# Patient Record
Sex: FEMALE | Race: WHITE | NOT HISPANIC OR LATINO
[De-identification: names, ages, dates, MRNs, and addresses within clinical notes are randomized per-mention and may not be internally consistent; named-entity substitution may affect disease eponyms.]

---

## 2024-01-16 ENCOUNTER — DASHBOARD ENCOUNTERS (OUTPATIENT)
Age: 44
End: 2024-01-16

## 2024-01-16 ENCOUNTER — OFFICE VISIT (OUTPATIENT)
Dept: URBAN - METROPOLITAN AREA CLINIC 72 | Facility: CLINIC | Age: 44
End: 2024-01-16
Payer: MEDICARE

## 2024-01-16 ENCOUNTER — LAB OUTSIDE AN ENCOUNTER (OUTPATIENT)
Dept: URBAN - METROPOLITAN AREA CLINIC 72 | Facility: CLINIC | Age: 44
End: 2024-01-16

## 2024-01-16 VITALS
WEIGHT: 164 LBS | BODY MASS INDEX: 24.29 KG/M2 | DIASTOLIC BLOOD PRESSURE: 92 MMHG | HEART RATE: 104 BPM | HEIGHT: 69 IN | TEMPERATURE: 96.9 F | SYSTOLIC BLOOD PRESSURE: 133 MMHG

## 2024-01-16 DIAGNOSIS — K50.80 CROHN'S DISEASE OF BOTH SMALL AND LARGE INTESTINE WITHOUT COMPLICATION: ICD-10-CM

## 2024-01-16 DIAGNOSIS — Z90.49 HISTORY OF BOWEL RESECTION: ICD-10-CM

## 2024-01-16 PROBLEM — 71833008: Status: ACTIVE | Noted: 2024-01-16

## 2024-01-16 PROBLEM — 442461003: Status: ACTIVE | Noted: 2024-01-16

## 2024-01-16 PROCEDURE — 99204 OFFICE O/P NEW MOD 45 MIN: CPT | Performed by: NURSE PRACTITIONER

## 2024-01-16 RX ORDER — ETANERCEPT 50 MG/ML
SOLUTION SUBCUTANEOUS
Qty: 8 | Status: ACTIVE | COMMUNITY

## 2024-01-16 RX ORDER — TRAZODONE HYDROCHLORIDE 50 MG/1
TABLET ORAL
Qty: 90 TABLET | Status: ACTIVE | COMMUNITY

## 2024-01-16 RX ORDER — BUPROPION HYDROCHLORIDE 150 MG/1
TABLET, FILM COATED, EXTENDED RELEASE ORAL
Qty: 90 TABLET | Status: ACTIVE | COMMUNITY

## 2024-01-16 NOTE — PHYSICAL EXAM NEUROLOGIC:
Presents to ER with complaint of dysuria and hematuria for 2 hours.  Urine is a cloudy yellow with a foul odor.  Denies vaginal discharge but is hurting in her low abdomin.  Patient's name and date of birth checked and is correct.    LOC: The patient is awake, alert, and oriented to place, time, situation. Affect is appropriate.  Speech is appropriate and clear.      APPEARANCE: Patient resting comfortably, reporting palpation, light headedness,  in no acute distress.  Patient is clean and well groomed.     SKIN: The skin is warm and dry; color consistent with ethnicity.  Patient has normal skin turgor and moist mucus membranes.  Skin intact; no breakdown or bruising noted.      MUSCULOSKELETAL: Patient moving upper and lower extremities without difficulty.  Denies weakness.      RESPIRATORY: Airway is open and patent. Respirations spontaneous, even, easy, and non-labored.  Patient has a normal effort and rate.  No accessory muscle use noted. Denies cough.  BS clear.     CARDIAC:  No peripheral edema noted. No complaints of chest pain.       ABDOMEN: Soft and non tender to palpation.  No distention noted.      NEUROLOGIC: Eyes open spontaneously.  Behavior appropriate to situation.  Follows commands; facial expression symmetrical.  Purposeful motor response noted; normal sensation in all extremities.  
oriented to person, place and time
No significant past surgical history

## 2024-01-16 NOTE — HPI-TODAY'S VISIT:
43-year-old female new to the clinic to establish care for her history of Crohn's.    Crohn's was diagnosed at age of 12, 32 years ago.  Moved from [place] 3 years ago.  Was following GI there.  Previously tried Humira, Cimzia, Remicade, Stelara had reactions Hx of 4 surgeries for abril-rectal abscesses.   Had 16 inches of small and large bowel removed in 2005, Has had multiple abdominal surgeries including total hyst, appendectomy and cholecystectomy with ventral hernia repair.    Has been on Enbrel for inflammatory arthritis 10 years.  Has medicare and [employer]  Last colonoscopy was 5 years ago in [place].  She isn't sure if she had inflammation or not Today she reports she was sick over the weekend and then her mother got sick as well.  Had some vomiting.  She had abdominal cramps. Pain is completely gone and symptoms resolved.  No melena or hematochezia.   No CP, SOB, palpitations, syncope, near-syncope or problems with anesthesia previously.   LMP: S/P total hyst  She was seeing Dr. Porras in rheumatology and now she is going to be seeing an arthritis clinic out of [place]. Has not had labs since the fall.

## 2024-04-11 ENCOUNTER — COLON (OUTPATIENT)
Dept: URBAN - METROPOLITAN AREA MEDICAL CENTER 40 | Facility: MEDICAL CENTER | Age: 44
End: 2024-04-11

## 2024-04-24 ENCOUNTER — OV EP (OUTPATIENT)
Dept: URBAN - METROPOLITAN AREA CLINIC 72 | Facility: CLINIC | Age: 44
End: 2024-04-24